# Patient Record
Sex: MALE | Race: AMERICAN INDIAN OR ALASKA NATIVE | NOT HISPANIC OR LATINO | Employment: OTHER | URBAN - METROPOLITAN AREA
[De-identification: names, ages, dates, MRNs, and addresses within clinical notes are randomized per-mention and may not be internally consistent; named-entity substitution may affect disease eponyms.]

---

## 2024-08-06 ENCOUNTER — TELEPHONE (OUTPATIENT)
Dept: MEDSURG UNIT | Facility: CLINIC | Age: 64
End: 2024-08-06
Payer: COMMERCIAL

## 2024-08-06 RX ORDER — HEPARIN SODIUM,PORCINE 10 UNIT/ML
5-10 VIAL (ML) INTRAVENOUS EVERY 24 HOURS
OUTPATIENT
Start: 2024-08-06

## 2024-08-06 RX ORDER — HEPARIN SODIUM,PORCINE 10 UNIT/ML
5-10 VIAL (ML) INTRAVENOUS
OUTPATIENT
Start: 2024-08-06

## 2024-08-06 RX ORDER — HEPARIN SODIUM (PORCINE) LOCK FLUSH IV SOLN 100 UNIT/ML 100 UNIT/ML
5-10 SOLUTION INTRAVENOUS
OUTPATIENT
Start: 2024-08-06

## 2024-08-07 ENCOUNTER — HOSPITAL ENCOUNTER (OUTPATIENT)
Facility: CLINIC | Age: 64
Discharge: HOME | End: 2024-08-08
Admitting: PHYSICIAN ASSISTANT
Payer: COMMERCIAL

## 2024-08-07 ENCOUNTER — HOSPITAL ENCOUNTER (OUTPATIENT)
Dept: CT IMAGING | Facility: CLINIC | Age: 64
Discharge: HOME | End: 2024-08-07
Attending: PHYSICIAN ASSISTANT
Payer: COMMERCIAL

## 2024-08-07 DIAGNOSIS — C67.8 MALIGNANT NEOPLASM OF OVERLAPPING SITES OF BLADDER (H): ICD-10-CM

## 2024-08-07 LAB
CREAT BLD-MCNC: 1.2 MG/DL (ref 0.7–1.3)
EGFRCR SERPLBLD CKD-EPI 2021: >60 ML/MIN/1.73M2

## 2024-08-07 PROCEDURE — 250N000009 HC RX 250: Performed by: PHYSICIAN ASSISTANT

## 2024-08-07 PROCEDURE — 82565 ASSAY OF CREATININE: CPT

## 2024-08-07 PROCEDURE — 71260 CT THORAX DX C+: CPT

## 2024-08-07 PROCEDURE — 250N000011 HC RX IP 250 OP 636: Performed by: PHYSICIAN ASSISTANT

## 2024-08-07 RX ORDER — IOPAMIDOL 755 MG/ML
89 INJECTION, SOLUTION INTRAVASCULAR ONCE
Status: COMPLETED | OUTPATIENT
Start: 2024-08-07 | End: 2024-08-07

## 2024-08-07 RX ADMIN — IOPAMIDOL 89 ML: 755 INJECTION, SOLUTION INTRAVENOUS at 15:46

## 2024-08-07 RX ADMIN — SODIUM CHLORIDE 65 ML: 9 INJECTION, SOLUTION INTRAVENOUS at 15:46

## 2024-08-07 ASSESSMENT — ACTIVITIES OF DAILY LIVING (ADL)
ADLS_ACUITY_SCORE: 33

## 2024-08-08 ASSESSMENT — ACTIVITIES OF DAILY LIVING (ADL)
ADLS_ACUITY_SCORE: 33

## 2025-06-09 ENCOUNTER — HOSPITAL ENCOUNTER (EMERGENCY)
Facility: CLINIC | Age: 65
Discharge: HOME | End: 2025-06-10
Attending: EMERGENCY MEDICINE | Admitting: EMERGENCY MEDICINE
Payer: COMMERCIAL

## 2025-06-09 VITALS
DIASTOLIC BLOOD PRESSURE: 79 MMHG | WEIGHT: 187 LBS | OXYGEN SATURATION: 97 % | SYSTOLIC BLOOD PRESSURE: 181 MMHG | HEIGHT: 67 IN | HEART RATE: 77 BPM | RESPIRATION RATE: 18 BRPM | BODY MASS INDEX: 29.35 KG/M2 | TEMPERATURE: 97.6 F

## 2025-06-09 DIAGNOSIS — S05.02XA INJ CONJUNCTIVA AND CORNEAL ABRASION W/O FB, LEFT EYE, INIT: ICD-10-CM

## 2025-06-09 PROCEDURE — 99283 EMERGENCY DEPT VISIT LOW MDM: CPT

## 2025-06-09 RX ORDER — PROPARACAINE HYDROCHLORIDE 5 MG/ML
1 SOLUTION/ DROPS OPHTHALMIC ONCE
Status: COMPLETED | OUTPATIENT
Start: 2025-06-09 | End: 2025-06-10

## 2025-06-09 ASSESSMENT — COLUMBIA-SUICIDE SEVERITY RATING SCALE - C-SSRS
1. IN THE PAST MONTH, HAVE YOU WISHED YOU WERE DEAD OR WISHED YOU COULD GO TO SLEEP AND NOT WAKE UP?: NO
6. HAVE YOU EVER DONE ANYTHING, STARTED TO DO ANYTHING, OR PREPARED TO DO ANYTHING TO END YOUR LIFE?: NO
2. HAVE YOU ACTUALLY HAD ANY THOUGHTS OF KILLING YOURSELF IN THE PAST MONTH?: NO

## 2025-06-09 ASSESSMENT — ACTIVITIES OF DAILY LIVING (ADL)
ADLS_ACUITY_SCORE: 41
ADLS_ACUITY_SCORE: 41

## 2025-06-10 PROCEDURE — 250N000009 HC RX 250: Performed by: STUDENT IN AN ORGANIZED HEALTH CARE EDUCATION/TRAINING PROGRAM

## 2025-06-10 RX ORDER — ERYTHROMYCIN 5 MG/G
OINTMENT OPHTHALMIC ONCE
Status: COMPLETED | OUTPATIENT
Start: 2025-06-10 | End: 2025-06-10

## 2025-06-10 RX ORDER — ERYTHROMYCIN 5 MG/G
0.5 OINTMENT OPHTHALMIC 4 TIMES DAILY
Qty: 3.5 G | Refills: 0 | Status: SHIPPED | OUTPATIENT
Start: 2025-06-10 | End: 2025-06-15

## 2025-06-10 RX ADMIN — PROPARACAINE HYDROCHLORIDE 1 DROP: 5 SOLUTION/ DROPS OPHTHALMIC at 00:01

## 2025-06-10 RX ADMIN — ERYTHROMYCIN 1 G: 5 OINTMENT OPHTHALMIC at 01:09

## 2025-06-10 RX ADMIN — FLUORESCEIN SODIUM 1 STRIP: 1 STRIP OPHTHALMIC at 00:00

## 2025-06-10 ASSESSMENT — ACTIVITIES OF DAILY LIVING (ADL): ADLS_ACUITY_SCORE: 41

## 2025-06-10 NOTE — ED PROVIDER NOTES
"  Emergency Department Note      History of Present Illness     Chief Complaint   No chief complaint on file.      HPI   Santhosh Orozco is a 64 year old male who presents with left eye pain. Around 3:30pm was working on trees when a branch hit him in the face. Was not wearing safety glasses. States it feels like there is something stuck in his eye or eyelid. Has pain when opening the eye, tearing, and redness of left eye. Flushed the eye out with water at home and has been using saline drops without relief. Has not taken anything for pain. Denies diplopia, loss of vision, pain with extraocular movements.     Independent Historian   None    Review of External Notes   None available    Past Medical History     Medical History and Problem List   No past medical history on file.    Medications   erythromycin (ROMYCIN) 5 MG/GM ophthalmic ointment        Surgical History   No past surgical history on file.    Physical Exam     Patient Vitals for the past 24 hrs:   BP Temp Temp src Pulse Resp SpO2 Height Weight   06/09/25 2154 (!) 181/79 97.6  F (36.4  C) Temporal 77 18 97 % 1.702 m (5' 7\") 84.8 kg (187 lb)     Physical Exam  General: In obvious discomfort   Head:  Slight erythematous abrasion to external left lower eyelid  Eyes:  The pupils are equal, round, and reactive to light    There is no nystagmus    Extraocular muscles are intact    Bilateral conjunctival injection sparing limbus, no obvious foreign bodies, negative Lacy sign      No chemosis  CV:  Normal rate  Normal rhythm     Normal S1/S2    No pathological murmur detected  Resp:  Lungs are clear  Neuro:  Speech is normal and fluent, there is no aphasia  Psych: Awake. Alert.          Diagnostics     Lab Results   Labs Ordered and Resulted from Time of ED Arrival to Time of ED Departure - No data to display    Imaging   No orders to display       Independent Interpretation   None    ED Course      Medications Administered   Medications   proparacaine " (ALCAINE) 0.5 % ophthalmic solution 1 drop (has no administration in time range)   fluorescein (FUL-SELINA) ophthalmic strip 1 strip (has no administration in time range)   erythromycin (ROMYCIN) ophthalmic ointment (has no administration in time range)       Procedures   Procedures     Discussion of Management   None    ED Course        Additional Documentation  None    Medical Decision Making / Diagnosis     CMS Diagnoses: None    MIPS   None               [facility]   Santhosh Orozco is a 64 year old male presenting with left eye pain after getting hit by a tree branch doing yard work. Denies diplopia, loss of vision, pain with extraocular movements. Tetanus is up to date. Considered globe rupture, foreign body, among others. Based on history and physical exam a 3mm x 1mm corneal abrasion to left lateral cornea at the 3:00 position. Patient to follow up outpatient with optho, is an established patient at [facility]. Given first dose of erythromycin ointment in emergency department and discharged with prescription. Patient understands and agrees to outpatient management at this time. Strict ED precautions given.      Disposition   The patient was discharged.     Diagnosis     ICD-10-CM    1. Inj conjunctiva and corneal abrasion w/o fb, left eye, init  S05.02XA            Discharge Medications   New Prescriptions    ERYTHROMYCIN (ROMYCIN) 5 MG/GM OPHTHALMIC OINTMENT    Place 0.5 inches Into the left eye 4 times daily for 5 days.         DONALD Celis Allison, PA-C  06/10/25 0055

## 2025-06-10 NOTE — ED PROVIDER NOTES
ED APC SUPERVISION NOTE:   I evaluated this patient in conjunction with Shireen Melendez.   I have participated in the care of the patient and personally performed key elements of the history, exam, and medical decision making.      HPI:   Santhosh Orozco is a 64 year old male was doing yard work when he get hit in the face with a branch over the left eye causing a scrape to the lower eye lid but has had persistent left eye pain. Pain is decreased by keeping the eye closed. He denies any vision changes. No double vision. No other injuries.       Independent Historian:   None    Review of External Notes:   I reviewed the [facility] - last Tdap 2018      EXAM:   General: Resting comfortably  Head:  Scalp, face, and head appear normal  Eyes:  Pupils equal, round, reactive to light.  Mild swelling to the left superior and inferior eyelids.  Lid margins and lashes are normal.  Left eye with conjunctival injection and scant chemosis.  There is a 1 x 3 mm linear corneal abrasion to the central lateral aspect of the left cornea at the 3 o'clock position.  Negative Lacy sign.  No foreign body in the left eye on eversion of the eyelids.  No hypopyon or hyphema.  Extraocular movements are intact and normal.  Right eye is normal.    Conjunctivae noninjected and sclera white  ENT:    The nose is normal.  Facial bones are atraumatic.    Ears/pinnae are normal  Neck:  Normal range of motion  MSK:  Normal tone  Skin:  No rash or lesions noted.  Neuro:  Speech is normal and fluent. No facial droop.    Moves all extremities spontaneously  Psych: Awake, Alert. Normal affect      Appropriate interactions         Independent Interpretation (X-rays, CTs, rhythm strip):  None    Consultations/Discussion of Management or Tests:  None     MIPS:      None    MEDICAL DECISION MAKING/ASSESSMENT AND PLAN:   Santhosh Orozco is a 64 year old male presents for evaluation of left eye pain after eye injury.  ED evaluation shows mild contusion of the  periorbital soft tissues with a left corneal abrasion.  No evidence of globe rupture, foreign body or other complication.  Patient started on erythromycin ointment.  First dose given in the ED.  No evidence of any other complication.  Patient denies any vision changes whatsoever.  I stressed the importance of close outpatient follow-up with his eye doctor in 2 to 3 days for recheck.  Return precautions provided and he was discharged in stable condition.     DIAGNOSIS:     ICD-10-CM    1. Inj conjunctiva and corneal abrasion w/o fb, left eye, init  S05.02XA             Patrick Babb MD  6/9/2025  [facility] EMERGENCY DEPT     Patrick Babb MD  06/10/25 1302

## 2025-06-10 NOTE — ED TRIAGE NOTES
Pt reports he poked himself in the L eye with a stick. Pt has erythema, tearing and pain.      Triage Assessment (Adult)       Row Name 06/09/25 9099          Triage Assessment    Airway WDL WDL        Respiratory WDL    Respiratory WDL WDL        Cardiac WDL    Cardiac WDL WDL        Peripheral/Neurovascular WDL    Peripheral Neurovascular WDL WDL        Cognitive/Neuro/Behavioral WDL    Cognitive/Neuro/Behavioral WDL WDL